# Patient Record
Sex: FEMALE | Race: OTHER | Employment: UNEMPLOYED | URBAN - METROPOLITAN AREA
[De-identification: names, ages, dates, MRNs, and addresses within clinical notes are randomized per-mention and may not be internally consistent; named-entity substitution may affect disease eponyms.]

---

## 2024-05-11 ENCOUNTER — HOSPITAL ENCOUNTER (EMERGENCY)
Age: 44
Discharge: HOME | End: 2024-05-11

## 2024-05-11 ENCOUNTER — APPOINTMENT (OUTPATIENT)
Dept: GENERAL RADIOLOGY | Age: 44
End: 2024-05-11

## 2024-05-11 VITALS
TEMPERATURE: 98.1 F | RESPIRATION RATE: 16 BRPM | SYSTOLIC BLOOD PRESSURE: 180 MMHG | HEART RATE: 80 BPM | OXYGEN SATURATION: 98 % | DIASTOLIC BLOOD PRESSURE: 98 MMHG

## 2024-05-11 DIAGNOSIS — E11.65 TYPE 2 DIABETES MELLITUS WITH HYPERGLYCEMIA, WITHOUT LONG-TERM CURRENT USE OF INSULIN (HCC): ICD-10-CM

## 2024-05-11 DIAGNOSIS — R60.0 BILATERAL LOWER EXTREMITY EDEMA: Primary | ICD-10-CM

## 2024-05-11 DIAGNOSIS — R94.31 ABNORMAL EKG: ICD-10-CM

## 2024-05-11 DIAGNOSIS — D64.9 ANEMIA, UNSPECIFIED TYPE: ICD-10-CM

## 2024-05-11 LAB
ALBUMIN SERPL-MCNC: 2.2 G/DL (ref 3.5–5)
ALBUMIN/GLOB SERPL: 0.5 (ref 1–1.9)
ALP SERPL-CCNC: 85 U/L (ref 35–104)
ALT SERPL-CCNC: 15 U/L (ref 12–65)
ANION GAP SERPL CALC-SCNC: 7 MMOL/L (ref 9–18)
AST SERPL-CCNC: 20 U/L (ref 15–37)
BACTERIA URNS QL MICRO: NEGATIVE /HPF
BASOPHILS # BLD: 0.1 K/UL (ref 0–0.2)
BASOPHILS NFR BLD: 1 % (ref 0–2)
BILIRUB SERPL-MCNC: 0.2 MG/DL (ref 0–1.2)
BILIRUB UR QL: NEGATIVE
BUN SERPL-MCNC: 19 MG/DL (ref 6–23)
CALCIUM SERPL-MCNC: 8.9 MG/DL (ref 8.8–10.2)
CASTS URNS QL MICRO: NORMAL /LPF
CHLORIDE SERPL-SCNC: 104 MMOL/L (ref 98–107)
CO2 SERPL-SCNC: 23 MMOL/L (ref 20–28)
COMMENT:: NORMAL
CREAT SERPL-MCNC: 0.57 MG/DL (ref 0.6–1.1)
DIFFERENTIAL METHOD BLD: ABNORMAL
EKG ATRIAL RATE: 99 BPM
EKG DIAGNOSIS: NORMAL
EKG P AXIS: 60 DEGREES
EKG P-R INTERVAL: 154 MS
EKG Q-T INTERVAL: 348 MS
EKG QRS DURATION: 84 MS
EKG QTC CALCULATION (BAZETT): 446 MS
EKG R AXIS: 65 DEGREES
EKG T AXIS: -55 DEGREES
EKG VENTRICULAR RATE: 99 BPM
EOSINOPHIL # BLD: 0.1 K/UL (ref 0–0.8)
EOSINOPHIL NFR BLD: 1 % (ref 0.5–7.8)
EPI CELLS #/AREA URNS HPF: NORMAL /HPF
ERYTHROCYTE [DISTWIDTH] IN BLOOD BY AUTOMATED COUNT: 16.8 % (ref 11.9–14.6)
GLOBULIN SER CALC-MCNC: 4.2 G/DL (ref 2.3–3.5)
GLUCOSE SERPL-MCNC: 234 MG/DL (ref 70–99)
GLUCOSE UR QL STRIP.AUTO: 100 MG/DL
HCG UR QL: NEGATIVE
HCT VFR BLD AUTO: 27.7 % (ref 35.8–46.3)
HGB BLD-MCNC: 8.3 G/DL (ref 11.7–15.4)
IMM GRANULOCYTES # BLD AUTO: 0 K/UL (ref 0–0.5)
IMM GRANULOCYTES NFR BLD AUTO: 0 % (ref 0–5)
KETONES UR-MCNC: NEGATIVE MG/DL
LEUKOCYTE ESTERASE UR QL STRIP: NEGATIVE
LYMPHOCYTES # BLD: 1.9 K/UL (ref 0.5–4.6)
LYMPHOCYTES NFR BLD: 21 % (ref 13–44)
MCH RBC QN AUTO: 20.5 PG (ref 26.1–32.9)
MCHC RBC AUTO-ENTMCNC: 30 G/DL (ref 31.4–35)
MCV RBC AUTO: 68.6 FL (ref 82–102)
MONOCYTES # BLD: 0.5 K/UL (ref 0.1–1.3)
MONOCYTES NFR BLD: 6 % (ref 4–12)
NEUTS SEG # BLD: 6.2 K/UL (ref 1.7–8.2)
NEUTS SEG NFR BLD: 71 % (ref 43–78)
NITRITE UR QL: NEGATIVE
NRBC # BLD: 0 K/UL (ref 0–0.2)
NT PRO BNP: 142 PG/ML (ref 0–125)
PH UR: 6.5 (ref 5–9)
PLATELET # BLD AUTO: 482 K/UL (ref 150–450)
PMV BLD AUTO: 9.1 FL (ref 9.4–12.3)
POTASSIUM SERPL-SCNC: 4.2 MMOL/L (ref 3.5–5.1)
PROT SERPL-MCNC: 6.4 G/DL (ref 6.3–8.2)
PROT UR QL: >300 MG/DL
RBC # BLD AUTO: 4.04 M/UL (ref 4.05–5.2)
RBC # UR STRIP: ABNORMAL
RBC #/AREA URNS HPF: NORMAL /HPF
SERVICE CMNT-IMP: ABNORMAL
SODIUM SERPL-SCNC: 134 MMOL/L (ref 136–145)
SP GR UR: 1.01 (ref 1–1.02)
UROBILINOGEN UR QL: 0.2 EU/DL (ref 0.2–1)
WBC # BLD AUTO: 8.8 K/UL (ref 4.3–11.1)
WBC URNS QL MICRO: NORMAL /HPF

## 2024-05-11 PROCEDURE — 81015 MICROSCOPIC EXAM OF URINE: CPT

## 2024-05-11 PROCEDURE — 80053 COMPREHEN METABOLIC PANEL: CPT

## 2024-05-11 PROCEDURE — 71046 X-RAY EXAM CHEST 2 VIEWS: CPT

## 2024-05-11 PROCEDURE — 81003 URINALYSIS AUTO W/O SCOPE: CPT

## 2024-05-11 PROCEDURE — 81025 URINE PREGNANCY TEST: CPT

## 2024-05-11 PROCEDURE — 99285 EMERGENCY DEPT VISIT HI MDM: CPT

## 2024-05-11 PROCEDURE — 85025 COMPLETE CBC W/AUTO DIFF WBC: CPT

## 2024-05-11 PROCEDURE — 93010 ELECTROCARDIOGRAM REPORT: CPT | Performed by: INTERNAL MEDICINE

## 2024-05-11 PROCEDURE — 83880 ASSAY OF NATRIURETIC PEPTIDE: CPT

## 2024-05-11 PROCEDURE — 93005 ELECTROCARDIOGRAM TRACING: CPT

## 2024-05-11 RX ORDER — FERROUS SULFATE 325(65) MG
325 TABLET ORAL
Qty: 30 TABLET | Refills: 0 | Status: SHIPPED | OUTPATIENT
Start: 2024-05-11 | End: 2024-06-10

## 2024-05-11 ASSESSMENT — LIFESTYLE VARIABLES
HOW OFTEN DO YOU HAVE A DRINK CONTAINING ALCOHOL: NEVER
HOW MANY STANDARD DRINKS CONTAINING ALCOHOL DO YOU HAVE ON A TYPICAL DAY: PATIENT DOES NOT DRINK

## 2024-05-11 ASSESSMENT — ENCOUNTER SYMPTOMS
DIARRHEA: 0
ABDOMINAL PAIN: 0
NAUSEA: 0
COLOR CHANGE: 0
VOMITING: 0
SHORTNESS OF BREATH: 0
COUGH: 0

## 2024-05-11 NOTE — ED PROVIDER NOTES
Emergency Department Provider Note       PCP: No primary care provider on file.   Age: 44 y.o.   Sex: female     DISPOSITION Decision To Discharge 05/11/2024 01:40:31 PM       ICD-10-CM    1. Bilateral lower extremity edema  R60.0 [facility]      2. Abnormal EKG  R94.31 [facility]      3. Type 2 diabetes mellitus with hyperglycemia, without long-term current use of insulin (HCC)  E11.65 metFORMIN (GLUCOPHAGE) 500 MG tablet     [facility]      4. Anemia, unspecified type  D64.9 ferrous sulfate (IRON 325) 325 (65 Fe) MG tablet     [facility]          Medical Decision Making     Vital signs reviewed, patient stable, NAD, afebrile, nontoxic in appearance     Blood pressure 188/93, heart rate 89, O2 saturation 100% on room air    44-year-old female presents for 3 days of lower leg swelling at the end of the day.  Triage note states 3 months but patient states it has been 3 days.  She states there is no swelling in the morning when she wakes up.  Denies chest pain or shortness of breath.  States she has diabetes but no other known medical conditions.  Currently does not have a primary care provider and has not been taking her medication for her diabetes.  She states she just stopped taking it.    Lungs are clear to auscultation bilateraly, abdomen is soft and nontender, mucous membranes are moist.  She has some 1+ pitting edema from ankles to mid lower legs bilaterally.  No swelling of the dorsal aspect of her feet.  No tenderness along deep venous system of bilateral lower extremities.    Discussed with patient that this very well may be some venous insufficiency based on her explanation of her swelling as it is worse at    Procedures    XR CHEST (2 VW)    Comprehensive Metabolic Panel    CBC with Auto Differential    Brain Natriuretic Peptide    Urinalysis, Micro    COLLECTION COMMENT    [facility] Card[facility] - [address], [place]    POC PREGNANCY UR-QUAL    POCT Urinalysis no Micro    POC Pregnancy Urine Qual    EKG 12 Lead    EKG 12 Lead        Medications given during this emergency department visit:  Medications - No data to display    New Prescriptions    FERROUS SULFATE (IRON 325) 325 (65 FE) MG TABLET    Take 1 tablet by mouth daily (with breakfast)    METFORMIN (GLUCOPHAGE) 500 MG TABLET    Take 1 tablet by mouth 2 times daily (with meals)        History reviewed. No pertinent past medical history.     History reviewed. No pertinent surgical history.     Social History     Socioeconomic History    Marital status: Single     Spouse name: None    Number of children: None    Years of education: None    Highest education level: None        Previous Medications    No medications on file        Results for orders placed or performed during the hospital encounter of 05/11/24   XR CHEST (2 VW)    Narrative    CXR     HISTORY:    Bilateral lower extremity edema    COMPARISON:   None.    TECHNIQUE:   2 views chest submitted for review.     FINDINGS:    The lungs are adequately expanded without evidence of acute infiltrate or  effusion.  The cardiac silhouette measures within normal.  Pulmonary vascularity  is unremarkable.  Osseous structures do not demonstrate any acute abnormality.      Impression    No plain film evidence for acute cardiopulmonary disease.       Comprehensive Metabolic Panel   Result Value Ref Range    Sodium 134 (L) 136 - 145 mmol/L    Potassium 4.2 3.5 - 5.1 mmol/L    Chloride 104 98 - 107 mmol/L    CO2 23 20 - 28 mmol/L    Anion Gap 7 (L) 9 - 18 mmol/L    Glucose 234 (H) 70 - 99 mg/dL    BUN 19 6 - 23 MG/DL    Creatinine 0.57 (L) 0.60 - 1.10 MG/DL    Est, Glom

## 2024-05-11 NOTE — ED NOTES
Patient mobility status  with no difficulty. Provider aware     I have reviewed discharge instructions with the patient.  The patient verbalized understanding.    Patient left ED via Discharge Method: ambulatory to Home with  self .    Opportunity for questions and clarification provided.     Patient given 2 scripts.           Mary Kay Muñoz LPN  05/11/24 [id number]

## 2024-05-11 NOTE — DISCHARGE INSTRUCTIONS
You were evaluated in the emergency department today for lower extremity swelling.    Your history and physical exam is overall reassuring.    No evidence of significant heart failure today.    I do recommend that you elevate your ankles as we discussed and I demonstrated.  I do recommend that you wear compression stockings that are graduated 20 to 30 mmHg are best.  You can find these affordably on [employer].  Recommend that you put them on first thing in the morning.  You do not need to sleep with these on    You did have some abnormalities on your EKG was not uncommon.  I have referred you to cardiology for follow-up.  They will call you to establish care    You do need to follow-up with primary care.  They will call you to establish care.    Your hemoglobin is on the low side and I do suspect some low iron.  I have written you for iron supplementation to be taken with food.  Iron can make you nauseous.  Iron can also make you constipated.  I do recommend that you use a stool softener when you are taking iron.  Iron also can turn your stool dark colors.    Contact [facility] as sometimes they can help you with venous insufficiency    I have refilled your metformin you can begin taking that today.    Recommend that you also contact [facility] as they have free and reduced cost health care    Return to the emergency department for chest pain, shortness of breath, signs and symptoms of stroke as listed in your discharge paperwork    I have included a work note for you to use at your discretion    Hoy lo evaluaron en el departamento de emergencias por hinchazón de las extremidades inferiores.    Carney historia clínica y carney examen físico son en general tranquilizadores.    No hay evidencia de insuficiencia cardíaca significativa en la actualidad.    Te recomiendo que eleves los tobillos arabella comentamos y te demostré. Le recomiendo que use medias de compresión graduadas de 20 a 30 mmHg. Puede encontrarlos a un precio

## 2024-05-11 NOTE — DISCHARGE INSTR - COC
Continuity of Care Form    Patient Name: Ike Franklin   :  1980  MRN:  [id number]    Admit date:  2024  Discharge date:  ***    Code Status Order: No Order   Advance Directives:     Admitting Physician:  No admitting provider for patient encounter.  PCP: No primary care provider on file.    Discharging Nurse: ***  Discharging Hospital Unit/Room#: D03/D03  Discharging Unit Phone Number: ***    Emergency Contact:   Extended Emergency Contact Information  Primary Emergency Contact: KevynAislinn  Home Phone: [phone]  Work Phone: [phone]  Mobile Phone: [phone]  Relation: Child  Preferred language: English   needed? No    Past Surgical History:  History reviewed. No pertinent surgical history.    Immunization History:     There is no immunization history on file for this patient.    Active Problems:  There is no problem list on file for this patient.      Isolation/Infection:   Isolation            No Isolation          Patient Infection Status       None to display            Nurse Assessment:  Last Vital Signs: BP (!) 188/93   Pulse 89   Temp 98.1 °F (36.7 °C) (Oral)   Resp 16   SpO2 100%     Last documented pain score (0-10 scale):    Last Weight:   Wt Readings from Last 1 Encounters:   No data found for Wt     Mental Status:  {IP PT MENTAL STATUS:}    IV Access:  { PATRIA IV ACCESS:145921576}    Nursing Mobility/ADLs:  Walking   {CHP DME ADLs:107503143}  Transfer  {CHP DME ADLs:136174736}  Bathing  {CHP DME ADLs:293562495}  Dressing  {CHP DME ADLs:865124548}  Toileting  {CHP DME ADLs:890004883}  Feeding  {CHP DME ADLs:454924593}  Med Admin  {CHP DME ADLs:018958352}  Med Delivery   { PATRIA MED Delivery:593341769}    Wound Care Documentation and Therapy:        Elimination:  Continence:   Bowel: {YES / NO:}  Bladder: {YES / NO:}  Urinary Catheter: {Urinary Catheter:812209123}   Colostomy/Ileostomy/Ileal Conduit: {YES / NO:}       Date of Last BM:  listed and that she requires {Admit to Appropriate Level of Care:34681} for {GREATER/LESS:306923784} 30 days.     Update Admission H&P: {CHP DME Changes in HandP:218920629}    PHYSICIAN SIGNATURE:  {Esignature:172289035}

## 2024-05-11 NOTE — ED NOTES
Pt's D/C BP of 180/98 reported to MIKE Gates. No further orders.      Mary Kay Muñoz, LPN  05/11/24 4675

## 2024-05-13 ENCOUNTER — TELEPHONE (OUTPATIENT)
Dept: INTERNAL MEDICINE CLINIC | Facility: CLINIC | Age: 44
End: 2024-05-13

## 2024-05-13 NOTE — TELEPHONE ENCOUNTER
Made the first attempt to contact the patient using the phone number listed in chart to schedule appointment with our office today. Called through Language services.  Session Code 88906,  ID  [id number] Unable to LVM  / Unable to accept calls at this time/ Called EC listed in chart  LVM

## 2024-05-14 NOTE — TELEPHONE ENCOUNTER
Made the second attempt to contact the patient using the phone number listed in chart to schedule appointment with our office today. Called through Language services.  Session Code 63872,  ID   [id number] Unable to LVM  / Unable to accept calls at this time/ Called EC listed in chart  LVM

## 2024-05-15 NOTE — TELEPHONE ENCOUNTER
Made the third attempt to contact the patient using the phone number listed in chart to schedule appointment with our office today. Called through Language services.  Session Code 63286   ID  [id number] JIA